# Patient Record
Sex: FEMALE | Race: WHITE | Employment: UNEMPLOYED | URBAN - METROPOLITAN AREA
[De-identification: names, ages, dates, MRNs, and addresses within clinical notes are randomized per-mention and may not be internally consistent; named-entity substitution may affect disease eponyms.]

---

## 2020-12-09 ENCOUNTER — APPOINTMENT (OUTPATIENT)
Dept: GENERAL RADIOLOGY | Age: 50
End: 2020-12-09
Payer: COMMERCIAL

## 2020-12-09 ENCOUNTER — HOSPITAL ENCOUNTER (OUTPATIENT)
Age: 50
Setting detail: OBSERVATION
Discharge: HOME | End: 2020-12-10
Attending: EMERGENCY MEDICINE | Admitting: INTERNAL MEDICINE
Payer: COMMERCIAL

## 2020-12-09 PROBLEM — R07.9 CHEST PAIN: Status: ACTIVE | Noted: 2020-12-09

## 2020-12-09 LAB
A/G RATIO: 1.5 (ref 1.1–2.2)
ALBUMIN SERPL-MCNC: 4 G/DL (ref 3.4–5)
ALP BLD-CCNC: 85 U/L (ref 40–129)
ALT SERPL-CCNC: 12 U/L (ref 10–40)
ANION GAP SERPL CALCULATED.3IONS-SCNC: 10 MMOL/L (ref 3–16)
AST SERPL-CCNC: 12 U/L (ref 15–37)
BASOPHILS ABSOLUTE: 0.1 K/UL (ref 0–0.2)
BASOPHILS RELATIVE PERCENT: 0.8 %
BILIRUB SERPL-MCNC: <0.2 MG/DL (ref 0–1)
BUN BLDV-MCNC: 22 MG/DL (ref 7–20)
CALCIUM SERPL-MCNC: 9.5 MG/DL (ref 8.3–10.6)
CHLORIDE BLD-SCNC: 107 MMOL/L (ref 99–110)
CO2: 23 MMOL/L (ref 21–32)
CREAT SERPL-MCNC: 0.7 MG/DL (ref 0.6–1.1)
D DIMER: 0.27 UG/ML FEU
EOSINOPHILS ABSOLUTE: 0.1 K/UL (ref 0–0.6)
EOSINOPHILS RELATIVE PERCENT: 0.8 %
GFR AFRICAN AMERICAN: >60
GFR NON-AFRICAN AMERICAN: >60
GLOBULIN: 2.7 G/DL
GLUCOSE BLD-MCNC: 93 MG/DL (ref 70–99)
HCT VFR BLD CALC: 37.1 % (ref 36–48)
HEMOGLOBIN: 12.2 G/DL (ref 12–16)
LYMPHOCYTES ABSOLUTE: 4 K/UL (ref 1–5.1)
LYMPHOCYTES RELATIVE PERCENT: 37.7 %
MCH RBC QN AUTO: 26.5 PG (ref 26–34)
MCHC RBC AUTO-ENTMCNC: 32.8 G/DL (ref 31–36)
MCV RBC AUTO: 80.7 FL (ref 80–100)
MONOCYTES ABSOLUTE: 0.7 K/UL (ref 0–1.3)
MONOCYTES RELATIVE PERCENT: 6.8 %
NEUTROPHILS ABSOLUTE: 5.8 K/UL (ref 1.7–7.7)
NEUTROPHILS RELATIVE PERCENT: 53.9 %
PDW BLD-RTO: 13.6 % (ref 12.4–15.4)
PLATELET # BLD: 279 K/UL (ref 135–450)
PMV BLD AUTO: 10.1 FL (ref 5–10.5)
POTASSIUM SERPL-SCNC: 3.8 MMOL/L (ref 3.5–5.1)
RBC # BLD: 4.59 M/UL (ref 4–5.2)
SODIUM BLD-SCNC: 140 MMOL/L (ref 136–145)
TOTAL PROTEIN: 6.7 G/DL (ref 6.4–8.2)
TROPONIN: <0.01 NG/ML
WBC # BLD: 10.7 K/UL (ref 4–11)

## 2020-12-09 PROCEDURE — 36415 COLL VENOUS BLD VENIPUNCTURE: CPT

## 2020-12-09 PROCEDURE — 85379 FIBRIN DEGRADATION QUANT: CPT

## 2020-12-09 PROCEDURE — 6370000000 HC RX 637 (ALT 250 FOR IP): Performed by: EMERGENCY MEDICINE

## 2020-12-09 PROCEDURE — 84484 ASSAY OF TROPONIN QUANT: CPT

## 2020-12-09 PROCEDURE — 99284 EMERGENCY DEPT VISIT MOD MDM: CPT

## 2020-12-09 PROCEDURE — 71045 X-RAY EXAM CHEST 1 VIEW: CPT

## 2020-12-09 PROCEDURE — 80053 COMPREHEN METABOLIC PANEL: CPT

## 2020-12-09 PROCEDURE — 85025 COMPLETE CBC W/AUTO DIFF WBC: CPT

## 2020-12-09 PROCEDURE — 93005 ELECTROCARDIOGRAM TRACING: CPT | Performed by: EMERGENCY MEDICINE

## 2020-12-09 RX ORDER — ERGOCALCIFEROL (VITAMIN D2) 1250 MCG
50000 CAPSULE ORAL WEEKLY
COMMUNITY
Start: 2020-08-19

## 2020-12-09 RX ORDER — VENLAFAXINE HYDROCHLORIDE 75 MG/1
75 CAPSULE, EXTENDED RELEASE ORAL 2 TIMES DAILY
COMMUNITY
Start: 2020-10-20

## 2020-12-09 RX ORDER — LOSARTAN POTASSIUM 50 MG/1
50 TABLET ORAL DAILY
COMMUNITY
Start: 2020-09-18

## 2020-12-09 RX ORDER — NITROGLYCERIN 0.4 MG/1
0.4 TABLET SUBLINGUAL EVERY 5 MIN PRN
Status: DISCONTINUED | OUTPATIENT
Start: 2020-12-09 | End: 2020-12-10 | Stop reason: HOSPADM

## 2020-12-09 RX ORDER — ARIPIPRAZOLE 5 MG/1
5 TABLET ORAL DAILY
COMMUNITY
Start: 2020-08-19

## 2020-12-09 RX ORDER — LEVOTHYROXINE SODIUM 0.12 MG/1
125 TABLET ORAL
COMMUNITY
Start: 2020-08-19

## 2020-12-09 RX ORDER — ASPIRIN 325 MG
325 TABLET ORAL ONCE
Status: COMPLETED | OUTPATIENT
Start: 2020-12-09 | End: 2020-12-09

## 2020-12-09 RX ORDER — TOPIRAMATE 100 MG/1
100 TABLET, FILM COATED ORAL 2 TIMES DAILY
COMMUNITY
Start: 2020-08-19

## 2020-12-09 RX ADMIN — ASPIRIN 325 MG ORAL TABLET 325 MG: 325 PILL ORAL at 22:13

## 2020-12-09 RX ADMIN — NITROGLYCERIN 0.4 MG: 0.4 TABLET SUBLINGUAL at 22:13

## 2020-12-09 RX ADMIN — NITROGLYCERIN 0.4 MG: 0.4 TABLET SUBLINGUAL at 22:41

## 2020-12-09 SDOH — HEALTH STABILITY: MENTAL HEALTH: HOW OFTEN DO YOU HAVE A DRINK CONTAINING ALCOHOL?: NEVER

## 2020-12-09 ASSESSMENT — PAIN DESCRIPTION - PAIN TYPE
TYPE: ACUTE PAIN

## 2020-12-09 ASSESSMENT — PAIN - FUNCTIONAL ASSESSMENT
PAIN_FUNCTIONAL_ASSESSMENT: PREVENTS OR INTERFERES SOME ACTIVE ACTIVITIES AND ADLS
PAIN_FUNCTIONAL_ASSESSMENT: ACTIVITIES ARE NOT PREVENTED

## 2020-12-09 ASSESSMENT — PAIN DESCRIPTION - LOCATION
LOCATION: CHEST

## 2020-12-09 ASSESSMENT — PAIN DESCRIPTION - ONSET: ONSET: SUDDEN

## 2020-12-09 ASSESSMENT — PAIN DESCRIPTION - DIRECTION
RADIATING_TOWARDS: NECK
RADIATING_TOWARDS: LEFT NECK
RADIATING_TOWARDS: NECK

## 2020-12-09 ASSESSMENT — PAIN SCALES - GENERAL
PAINLEVEL_OUTOF10: 0
PAINLEVEL_OUTOF10: 3

## 2020-12-09 ASSESSMENT — PAIN DESCRIPTION - PROGRESSION
CLINICAL_PROGRESSION: GRADUALLY IMPROVING
CLINICAL_PROGRESSION: GRADUALLY WORSENING
CLINICAL_PROGRESSION: GRADUALLY IMPROVING

## 2020-12-09 ASSESSMENT — PAIN DESCRIPTION - ORIENTATION
ORIENTATION: LEFT

## 2020-12-09 ASSESSMENT — PAIN DESCRIPTION - FREQUENCY
FREQUENCY: CONTINUOUS
FREQUENCY: CONTINUOUS

## 2020-12-09 ASSESSMENT — PAIN DESCRIPTION - DESCRIPTORS: DESCRIPTORS: HEAVINESS

## 2020-12-09 ASSESSMENT — HEART SCORE: ECG: 0

## 2020-12-09 NOTE — LETTER
[facility] 3N IP Rehab  [address] [facility]  Phone: [phone]             December 10, 2020    Patient: Serafin Lundberg   YOB: 1970   Date of Visit: 12/9/2020       To Whom It May Concern:    Serafin Lundberg was seen and treated in our facility  beginning 12/9/2020 until 12/10/20. She may return to work on 12/14/20.       Sincerely,       Wellington Sharma RN         Signature:__________________________________

## 2020-12-10 VITALS
OXYGEN SATURATION: 98 % | TEMPERATURE: 98.1 F | DIASTOLIC BLOOD PRESSURE: 74 MMHG | HEART RATE: 79 BPM | WEIGHT: 261.69 LBS | HEIGHT: 69 IN | SYSTOLIC BLOOD PRESSURE: 106 MMHG | BODY MASS INDEX: 38.76 KG/M2 | RESPIRATION RATE: 16 BRPM

## 2020-12-10 PROBLEM — I10 ESSENTIAL HYPERTENSION: Status: ACTIVE | Noted: 2020-12-10

## 2020-12-10 PROBLEM — E03.9 HYPOTHYROID: Status: ACTIVE | Noted: 2020-12-10

## 2020-12-10 LAB
CHOLESTEROL, TOTAL: 162 MG/DL (ref 0–199)
EKG ATRIAL RATE: 65 BPM
EKG DIAGNOSIS: NORMAL
EKG P AXIS: 52 DEGREES
EKG P-R INTERVAL: 154 MS
EKG Q-T INTERVAL: 412 MS
EKG QRS DURATION: 88 MS
EKG QTC CALCULATION (BAZETT): 428 MS
EKG R AXIS: 35 DEGREES
EKG T AXIS: 36 DEGREES
EKG VENTRICULAR RATE: 65 BPM
HCG QUALITATIVE: NEGATIVE
HDLC SERPL-MCNC: 50 MG/DL (ref 40–60)
LDL CHOLESTEROL CALCULATED: 82 MG/DL
LV EF: 72 %
LVEF MODALITY: NORMAL
SARS-COV-2, NAAT: NOT DETECTED
TRIGL SERPL-MCNC: 151 MG/DL (ref 0–150)
TROPONIN: <0.01 NG/ML
TROPONIN: <0.01 NG/ML
VLDLC SERPL CALC-MCNC: 30 MG/DL

## 2020-12-10 PROCEDURE — 36415 COLL VENOUS BLD VENIPUNCTURE: CPT

## 2020-12-10 PROCEDURE — U0002 COVID-19 LAB TEST NON-CDC: HCPCS

## 2020-12-10 PROCEDURE — 90686 IIV4 VACC NO PRSV 0.5 ML IM: CPT | Performed by: INTERNAL MEDICINE

## 2020-12-10 PROCEDURE — 6370000000 HC RX 637 (ALT 250 FOR IP): Performed by: INTERNAL MEDICINE

## 2020-12-10 PROCEDURE — 90471 IMMUNIZATION ADMIN: CPT

## 2020-12-10 PROCEDURE — 84484 ASSAY OF TROPONIN QUANT: CPT

## 2020-12-10 PROCEDURE — 6360000002 HC RX W HCPCS: Performed by: INTERNAL MEDICINE

## 2020-12-10 PROCEDURE — G0378 HOSPITAL OBSERVATION PER HR: HCPCS

## 2020-12-10 PROCEDURE — 3430000000 HC RX DIAGNOSTIC RADIOPHARMACEUTICAL: Performed by: INTERNAL MEDICINE

## 2020-12-10 PROCEDURE — 93010 ELECTROCARDIOGRAM REPORT: CPT | Performed by: INTERNAL MEDICINE

## 2020-12-10 PROCEDURE — 96372 THER/PROPH/DIAG INJ SC/IM: CPT

## 2020-12-10 PROCEDURE — 84703 CHORIONIC GONADOTROPIN ASSAY: CPT

## 2020-12-10 PROCEDURE — 93017 CV STRESS TEST TRACING ONLY: CPT

## 2020-12-10 PROCEDURE — A9502 TC99M TETROFOSMIN: HCPCS | Performed by: INTERNAL MEDICINE

## 2020-12-10 PROCEDURE — 78452 HT MUSCLE IMAGE SPECT MULT: CPT

## 2020-12-10 PROCEDURE — 80061 LIPID PANEL: CPT

## 2020-12-10 PROCEDURE — 2580000003 HC RX 258: Performed by: INTERNAL MEDICINE

## 2020-12-10 RX ORDER — SODIUM CHLORIDE 9 MG/ML
INJECTION, SOLUTION INTRAVENOUS CONTINUOUS
Status: DISCONTINUED | OUTPATIENT
Start: 2020-12-10 | End: 2020-12-10 | Stop reason: HOSPADM

## 2020-12-10 RX ORDER — ACETAMINOPHEN 650 MG/1
650 SUPPOSITORY RECTAL EVERY 4 HOURS PRN
Status: DISCONTINUED | OUTPATIENT
Start: 2020-12-10 | End: 2020-12-10 | Stop reason: HOSPADM

## 2020-12-10 RX ORDER — POLYETHYLENE GLYCOL 3350 17 G/17G
17 POWDER, FOR SOLUTION ORAL DAILY PRN
Status: DISCONTINUED | OUTPATIENT
Start: 2020-12-10 | End: 2020-12-10 | Stop reason: HOSPADM

## 2020-12-10 RX ORDER — ACETAMINOPHEN 325 MG/1
650 TABLET ORAL EVERY 4 HOURS PRN
Status: DISCONTINUED | OUTPATIENT
Start: 2020-12-10 | End: 2020-12-10 | Stop reason: HOSPADM

## 2020-12-10 RX ORDER — SODIUM CHLORIDE 0.9 % (FLUSH) 0.9 %
10 SYRINGE (ML) INJECTION EVERY 12 HOURS SCHEDULED
Status: DISCONTINUED | OUTPATIENT
Start: 2020-12-10 | End: 2020-12-10 | Stop reason: HOSPADM

## 2020-12-10 RX ORDER — VENLAFAXINE HYDROCHLORIDE 75 MG/1
75 CAPSULE, EXTENDED RELEASE ORAL 2 TIMES DAILY
Status: DISCONTINUED | OUTPATIENT
Start: 2020-12-10 | End: 2020-12-10 | Stop reason: HOSPADM

## 2020-12-10 RX ORDER — ONDANSETRON 2 MG/ML
4 INJECTION INTRAMUSCULAR; INTRAVENOUS EVERY 4 HOURS PRN
Status: DISCONTINUED | OUTPATIENT
Start: 2020-12-10 | End: 2020-12-10 | Stop reason: HOSPADM

## 2020-12-10 RX ORDER — TOPIRAMATE 100 MG/1
100 TABLET, FILM COATED ORAL 2 TIMES DAILY
Status: DISCONTINUED | OUTPATIENT
Start: 2020-12-10 | End: 2020-12-10 | Stop reason: HOSPADM

## 2020-12-10 RX ORDER — ARIPIPRAZOLE 5 MG/1
5 TABLET ORAL DAILY
Status: DISCONTINUED | OUTPATIENT
Start: 2020-12-10 | End: 2020-12-10 | Stop reason: HOSPADM

## 2020-12-10 RX ORDER — LEVOTHYROXINE SODIUM 0.12 MG/1
125 TABLET ORAL
Status: DISCONTINUED | OUTPATIENT
Start: 2020-12-10 | End: 2020-12-10 | Stop reason: HOSPADM

## 2020-12-10 RX ORDER — ATORVASTATIN CALCIUM 10 MG/1
10 TABLET, FILM COATED ORAL NIGHTLY
Status: DISCONTINUED | OUTPATIENT
Start: 2020-12-10 | End: 2020-12-10

## 2020-12-10 RX ORDER — LOSARTAN POTASSIUM 50 MG/1
50 TABLET ORAL DAILY
Status: DISCONTINUED | OUTPATIENT
Start: 2020-12-10 | End: 2020-12-10 | Stop reason: HOSPADM

## 2020-12-10 RX ORDER — SODIUM CHLORIDE 0.9 % (FLUSH) 0.9 %
10 SYRINGE (ML) INJECTION PRN
Status: DISCONTINUED | OUTPATIENT
Start: 2020-12-10 | End: 2020-12-10 | Stop reason: HOSPADM

## 2020-12-10 RX ADMIN — VORTIOXETINE 20 MG: 10 TABLET, FILM COATED ORAL at 13:34

## 2020-12-10 RX ADMIN — TOPIRAMATE 100 MG: 100 TABLET, FILM COATED ORAL at 08:01

## 2020-12-10 RX ADMIN — ARIPIPRAZOLE 5 MG: 5 TABLET ORAL at 13:33

## 2020-12-10 RX ADMIN — REGADENOSON 0.4 MG: 0.08 INJECTION, SOLUTION INTRAVENOUS at 10:11

## 2020-12-10 RX ADMIN — ENOXAPARIN SODIUM 40 MG: 40 INJECTION SUBCUTANEOUS at 08:01

## 2020-12-10 RX ADMIN — TETROFOSMIN 30 MILLICURIE: 1.38 INJECTION, POWDER, LYOPHILIZED, FOR SOLUTION INTRAVENOUS at 10:14

## 2020-12-10 RX ADMIN — VENLAFAXINE HYDROCHLORIDE 75 MG: 75 CAPSULE, EXTENDED RELEASE ORAL at 08:01

## 2020-12-10 RX ADMIN — TETROFOSMIN 10 MILLICURIE: 1.38 INJECTION, POWDER, LYOPHILIZED, FOR SOLUTION INTRAVENOUS at 08:59

## 2020-12-10 RX ADMIN — INFLUENZA A VIRUS A/VICTORIA/2454/2019 IVR-207 (H1N1) ANTIGEN (PROPIOLACTONE INACTIVATED), INFLUENZA A VIRUS A/HONG KONG/2671/2019 IVR-208 (H3N2) ANTIGEN (PROPIOLACTONE INACTIVATED), INFLUENZA B VIRUS B/VICTORIA/705/2018 BVR-11 ANTIGEN (PROPIOLACTONE INACTIVATED), INFLUENZA B VIRUS B/PHUKET/3073/2013 BVR-1B ANTIGEN (PROPIOLACTONE INACTIVATED) 0.5 ML: 15; 15; 15; 15 INJECTION, SUSPENSION INTRAMUSCULAR at 08:08

## 2020-12-10 RX ADMIN — SODIUM CHLORIDE: 9 INJECTION, SOLUTION INTRAVENOUS at 05:13

## 2020-12-10 RX ADMIN — LEVOTHYROXINE SODIUM 125 MCG: 0.12 TABLET ORAL at 08:00

## 2020-12-10 ASSESSMENT — PAIN SCALES - GENERAL: PAINLEVEL_OUTOF10: 0

## 2020-12-10 NOTE — H&P
Hospital Medicine  History and Physical    PCP: No primary care provider on file. Patient Name: Vamshi Narayanan    Date of Service: Pt seen/examined on 12/10/2020 and placed in observation. CHIEF COMPLAINT:  Pt c/o chest pain  HISTORY OF PRESENT ILLNESS: Pt is an 45y.o. year-old female with a history of hypertension, hypothyroidism and was positive for COVID19 on 11/21/2020. She presented to the [facility] emergency room for evaluation following onset of chest pain 2 hours before her arrival.  He describes a moderately severe tightness in her left chest which is worse with exertion and improved with rest.  The sensation has been present since onset and has eased up quite a bit since initial presentation. Pain does not radiate it is not made better or worse when she takes a deep breath, it is not associated with diaphoresis, nausea or shortness of breath. Her D-dimer was not elevated and a CXR had no acute disease. She is being admitted for further evaluation and treatment. Associated signs and symptoms do not include typical chest pain, shortness of breath, diaphoresis, edema, orthopnea, paroxysmal nocturnal dyspnea, fever or chills. Past Medical History:        Diagnosis Date    Diabetes mellitus ([facility] [address]     Hypertension     Hypothyroid        Past Surgical History:    History reviewed. No pertinent surgical history. Allergies:  Clindamycin and Moxifloxacin    Medications Prior to Admission:    Prior to Admission medications    Medication Sig Start Date End Date Taking?  Authorizing Provider   metFORMIN (GLUCOPHAGE) 500 MG tablet Take 500 mg by mouth daily (with breakfast) 8/19/20  Yes Historical Provider, MD   losartan (COZAAR) 50 MG tablet Take 50 mg by mouth daily 9/18/20  Yes Historical Provider, MD   ARIPiprazole (ABILIFY) 5 MG tablet Take 5 mg by mouth daily 8/19/20  Yes Historical Provider, MD   levothyroxine (SYNTHROID) 125 MCG tablet Take 125 mcg by mouth every morning (before breakfast) 8/19/20  Yes Historical Provider, MD   topiramate (TOPAMAX) 100 MG tablet Take 100 mg by mouth 2 times daily 8/19/20  Yes Historical Provider, MD   venlafaxine (EFFEXOR XR) 75 MG extended release capsule Take 75 mg by mouth 2 times daily 10/20/20  Yes Historical Provider, MD   VORTIoxetine HBr (TRINTELLIX) 20 MG TABS tablet Take 20 mg by mouth daily (with breakfast) 9/28/20  Yes Historical Provider, MD   ergocalciferol (ERGOCALCIFEROL) 1.25 MG (12000 UT) capsule Take 50,000 Units by mouth once a week 8/19/20  Yes Historical Provider, MD       Family History:    Family history is negative for accelerated coronary artery disease, diabetes or malignancies. Social History:   TOBACCO:   reports that she has never smoked. She has never used smokeless tobacco.  ETOH:   reports no history of alcohol use. OCCUPATION:      REVIEW OF SYSTEMS:  A full review of systems was performed and is negative except for that which appears in the HPI    Physical Exam:    Vitals: /64   Pulse 64   Temp 97.8 °F (36.6 °C) (Oral)   Resp 16   Ht 5' 9\" (1.753 m)   Wt 268 lb 15.4 oz (122 kg)   SpO2 100%   BMI 39.72 kg/m²   General appearance: WD/WN 45y.o. year-old  female who is alert, appears stated age and is cooperative  HEENT: Head: Normocephalic, no lesions, without obvious abnormality. Eye: Normal external eye, conjunctiva, lids cornea, PEERL. Ears: Normal external ears. Non-tender. Nose: Normal external nose, mucus membranes and septum. Pharynx: Dental Hygiene adequate. Normal buccal mucosa. Normal pharynx. Neck: no adenopathy, no carotid bruit, no JVD, supple, symmetrical, trachea midline and thyroid not enlarged, symmetric, no tenderness/mass/nodules  Lungs: clear to auscultation bilaterally and no use of accessory muscles.   Heart: regular rate and rhythm, S1, S2 normal, no murmur, click, rub or gallop and normal apical impulse  Abdomen: soft, non-tender; bowel sounds normal; no masses, no organomegaly  Extremities: extremities atraumatic, no cyanosis or edema and Homans sign is negative, no sign of DVT. Capillary Refill: Acceptable < 3 seconds   Peripheral Pulses: +3 easily felt, not easily obliterated with pressures   Skin: Skin color, texture, turgor normal. No rashes or lesions on exposed skin  Neurologic: Neurovascularly intact without any focal sensory/motor deficits. Cranial nerves: II-XII intact, grossly non-focal. Gait was not tested. Psychiatric: Alert and oriented, thought content appropriate, normal insight    CBC:   Recent Labs     12/09/20 2205   WBC 10.7   HGB 12.2        BMP:    Recent Labs     12/09/20 2205      K 3.8      CO2 23   BUN 22*   CREATININE 0.7   GLUCOSE 93     Troponin:   Recent Labs     12/09/20 2205   TROPONINI <0.01     PT/INR:  No results found for: PTINR  U/A:  No results found for: LEUKOCYTESUR, NITRITE, RBCUA, SPECGRAV, [address], BILIRUBINUR, BLOODU, Jeff Amour      RAD:   I have independently reviewed and interpreted the imaging studies below and based my recommendations to the patient on those findings. Xr Chest Portable    Result Date: 12/9/2020  EXAMINATION: ONE XRAY VIEW OF THE CHEST 12/9/2020 10:06 pm COMPARISON: None. HISTORY: ORDERING SYSTEM PROVIDED HISTORY: chest pain TECHNOLOGIST PROVIDED HISTORY: Reason for exam:->chest pain Reason for Exam: acute cp x 2 hours ago Acuity: Acute Type of Exam: Initial Additional signs and symptoms: positive for covid-19 on 11/22/20, recovered with no hospitalization needed FINDINGS: Portable study is limited by obesity. Heart size and configuration are normal.  Hilar and mediastinal structures are unremarkable. The lungs are clear. No pneumothorax or pleural fluid. No acute bone finding. No acute cardiopulmonary disease.          EKG:   Read by ER in the absence of a Cardiologist shows      Assessment:   Principal Problem:    Chest pain  Active Problems:    Essential hypertension    Acquired hypothyroidism  Resolved Problems:    * No resolved hospital problems. *      Plan:       COVID 19 screening ordered    Chest pain - Patient will be admitted and monitored on telemetry, and we will check serial cardiac enzymes. Aspirin was started in the Emergency Room. Cardiac testing has been ordered for risk stratification. I have discussed other possible etiologies of the symptoms such as Musculoskeletal Pain and GERD, including unique presenting characteritics of each. Patient understands that if the evaluation does not show that the symptoms are secondary to ischemic changes, she will be discharged and instructed to follow up with her outpatient physician to help determine the etiology of her symptoms. Essential (primary) hypertension - continue home meds and monitor blood pressure    Acquired hypothyroidism - stable; continue Levothyroxine          DVT Prophylaxis: Lovenox   Diet: Diet NPO Effective Now Exceptions are: Sips with Meds  Code Status: Full Code  (Advanced care planning has been discussed with patient and/or responsible family member and is reflected in the code status.  Further orders associated with this have been entered if appropriate)    Disposition: Anticipate that patient will remain in the hospital for 1 to 2 days depending on further evaluation and clinical course    Please note that over 50 minutes was spent in evaluating the patient, review of records and results, discussion with staff/family, etc.    Casie Massey MD

## 2020-12-10 NOTE — PLAN OF CARE
Problem: Pain:  Goal: Pain level will decrease  Description: Pain level will decrease  Outcome: Ongoing  C/o bilateral  rib cage pain, prn Narco given x1 during the night.     Problem: Pain:  Goal: Control of acute pain  Description: Control of acute pain  Outcome: Ongoing        Problem: Falls - Risk of:  Goal: Will remain free from falls  Description: Will remain free from falls  Outcome  Calls for assistance appropriately

## 2020-12-10 NOTE — PROGRESS NOTES
Any changes have been noted. PHYSICAL EXAM:  BP (!) 94/59   Pulse 71   Temp 97.5 °F (36.4 °C) (Oral)   Resp 16   Ht 5' 9\" (1.753 m)   Wt 261 lb 11 oz (118.7 kg)   SpO2 96%   BMI 38.64 kg/m²     No intake or output data in the 24 hours ending 12/10/20 1015     General appearance:   No apparent distress, appears stated age. Cooperative. HEENT:  Normocephalic, atraumatic. PERRLA. EOMi. Conjunctivae/corneas clear, no icterus, non-injected. Neck: Supple, with full range of motion. No jugular venous distention. Trachea midline. Respiratory:  Normal respiratory effort. Clear to auscultation, bilaterally without Rales/Wheezes/Rhonchi. Cardiovascular:  Regular rate and rhythm without murmurs, rubs or gallops. Abdomen: Soft, non-tender, non-distended, without rebound or guarding. Normal bowel sounds. Musculoskeletal:  No clubbing, cyanosis or edema bilaterally. Full range of motion without deformity. Skin: Skin color, texture, turgor normal.  No rashes or lesions. Neurologic:  Neurovascularly intact without any focal sensory/motor deficits. Cranial nerves: II-XII intact, grossly intact. No facial asymmetry, tongue midline.    Psychiatric:  Alert and oriented, thought content appropriate  Capillary Refill: Brisk,< 3 seconds   Peripheral Pulses: +2 palpable, equal bilaterally       LABS:    Lab Results   Component Value Date    WBC 10.7 12/09/2020    HGB 12.2 12/09/2020    HCT 37.1 12/09/2020    MCV 80.7 12/09/2020     12/09/2020    LYMPHOPCT 37.7 12/09/2020    RBC 4.59 12/09/2020    MCH 26.5 12/09/2020    MCHC 32.8 12/09/2020    RDW 13.6 12/09/2020       Lab Results   Component Value Date    CREATININE 0.7 12/09/2020    BUN 22 (H) 12/09/2020     12/09/2020    K 3.8 12/09/2020     12/09/2020    CO2 23 12/09/2020       No results found for: MG    Lab Results   Component Value Date    ALT 12 12/09/2020    AST 12 (L) 12/09/2020    ALKPHOS 85 12/09/2020    BILITOT <0.2 12/09/2020        No flowsheet data found. No results found for: LABA1C    Imaging:  XR CHEST PORTABLE   Final Result   No acute cardiopulmonary disease. NM Cardiac Stress Test Nuclear Imaging    (Results Pending)       Scheduled and prn Medications:    Scheduled Meds:   VORTIoxetine  20 mg Oral Daily with breakfast    venlafaxine  75 mg Oral BID    topiramate  100 mg Oral BID    losartan  50 mg Oral Daily    levothyroxine  125 mcg Oral QAM AC    ARIPiprazole  5 mg Oral Daily    sodium chloride flush  10 mL Intravenous 2 times per day    enoxaparin  40 mg Subcutaneous Daily     Continuous Infusions:   sodium chloride 75 mL/hr at 12/10/20 0513     PRN Meds:.sodium chloride flush, acetaminophen **OR** acetaminophen, polyethylene glycol, ondansetron, nitroGLYCERIN    Assessment & Plan:        COVID 19 screening ordered     Chest pain   -  Telemetry  -  serial cardiac enzymes - neg   - Aspirin was started in the Emergency Room. - Cardiac testing has been ordered for risk stratification.  -  I have discussed other possible etiologies of the symptoms such as Musculoskeletal Pain and GERD, including unique presenting characteritics of each. Patient understands that if the evaluation does not show that the symptoms are secondary to ischemic changes, she will be discharged and instructed to follow up with her outpatient physician to help determine the etiology of her symptoms.      Essential (primary) hypertension   - continue home meds and monitor blood pressure     Acquired hypothyroidism   - stable; continue Levothyroxine       Continue current regimen/therapies. Monitor. Adjust medical regimen as appropriate. Body mass index is 38.64 kg/m². The patient and / or the family were informed of the results of any tests, a time was given to answer questions, a plan was proposed and they agreed with plan.       DVT ppx: lovenox      Diet: Diet NPO Effective Now Exceptions are: Sips with Meds    Consults:  IP CONSULT TO HOSPITALIST    DISPO/placement plan:home    Code Status: Full Code      Kevin Cano, SATISH - QIAN  12/10/20

## 2020-12-10 NOTE — PROGRESS NOTES
Pt arrived via stretcher from [place] ED to room 3271. VS, assessment, and admission completed. 4 eyes assessment completed. Patient is A X O x4, communicates appropriately. Being admitted with chest pain, denies chest pain at the moment. Pt oriented to the  and room. Call light and bedside table in reach. All questions answered. Pt resting quietly in bed with no complaints  at this time. Dr. Jam Mcnair notified of patient's arrival. Will continue to monitor.

## 2020-12-10 NOTE — DISCHARGE SUMMARY
Hospital Medicine Discharge Summary    Patient ID: Adam Ramos      Patient's PCP: No primary care provider on file. Admit Date: 12/9/2020     Discharge Date:   12/10/2020    Admitting Physician: Harmeet Porter MD     Discharge Physician: SATISH Garrido CNP       Discharge Diagnoses: Active Hospital Problems    Diagnosis Date Noted    Essential hypertension [I10] 12/10/2020    Acquired hypothyroidism [E03.9] 12/10/2020    Chest pain [R07.9] 12/09/2020       The patient was seen and examined on day of discharge and this discharge summary is in conjunction with any daily progress note from day of discharge. Disposition:  [x] Home  [] Home with home health [] Rehab [] Psych [] SNF  [] LTAC  [] Long term nursing home or group home [] Transfer to ICU  [] Transfer to PCU [] Other:      Discharge Instructions/Follow-up:  Lose weight, exercise, follow low fat diet (BMI 38.64)     PCP/SNF to follow up: PCP 1-2 wks    D/C condition: stable    Code status: full    Activity: as tolerated     Diet: low fat    D/C Meds: cont home meds    Hospital Course: Pt is an 45y.o. year-old female with a history of hypertension, hypothyroidism and was positive for COVID19 on 11/21/2020. She presented to the [place] emergency room for evaluation following onset of chest pain 2 hours before her arrival. Bryan Rosalba describes a moderately severe tightness in her left chest which is worse with exertion and improved with rest.  The sensation has been present since onset and has eased up quite a bit since initial presentation. Sadiq Riley does not radiate it is not made better or worse when she takes a deep breath, it is not associated with diaphoresis, nausea or shortness of breath.  Her D-dimer was not elevated and a CXR had no acute disease.  She is being admitted for further evaluation and treatment.  Associated signs and symptoms do not include typical chest pain, shortness of breath, diaphoresis, edema, orthopnea, paroxysmal nocturnal dyspnea, fever or chills.     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 Denies recent strenuous activity. Pain free at this time. Had some symptoms of chest pressure and sob with stress test. BMI 38.64 could benefit from exercise and wt loss. Discussed with patient low fat diet and exercise. Possibly deconditioned as well. Pain not pleuritic and no respiratory symptoms.      No reproducible chest pain     Stress test was performed and was neg. Patient's vitals and labs remained stable and unremarkable while here including serial troponins. Her pain was in her left breast that started out heavy feeling and then got more \"poking\" in nature and then started to move into her neck. She had the pain until she got NTG which relieved the symptoms after 2 SL tabs. It recurred during her stress test. She reports having SOB with it during the test as well. She states she had COVID prior to admission in November but was not treated for it. She was not treated for it. 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 The patient had minimal elevation in triglycerides, by one point, so will hold off on starting statin now but have discussed low fat diet and exercise. She is now medically ready for discharge. She should follow up as above and continue home meds as before. Exam:     BP (!) 94/59   Pulse 71   Temp 97.5 °F (36.4 °C) (Oral)   Resp 16   Ht 5' 9\" (1.753 m)   Wt 261 lb 11 oz (118.7 kg)   SpO2 96%   BMI 38.64 kg/m²   General appearance: No apparent distress, appears stated age and cooperative. HEENT: Pupils equal, round, and reactive to light. Conjunctivae/corneas clear. Neck: Supple, with full range of motion. No jugular venous distention. Trachea midline. Respiratory:  Normal respiratory effort. Clear to auscultation, bilaterally without Rales/Wheezes/Rhonchi.   Cardiovascular: Regular rate and rhythm with normal S1/S2 without murmurs, rubs or gallops. Abdomen: Soft, non-tender, non-distended with normal bowel sounds. Musculoskelatal: No clubbing, cyanosis or edema bilaterally. Full range of motion without deformity. Skin: Skin color, texture, turgor normal.  No rashes or lesions. Neurologic:  Neurovascularly intact without any focal sensory/motor deficits. Cranial nerves: II-XII intact, grossly non-focal.  Psychiatric: Alert and oriented, thought content appropriate, normal insight      Consults:     IP CONSULT TO HOSPITALIST    Diagnostic tests: Imaging:  XR CHEST PORTABLE   Final Result   No acute cardiopulmonary disease.           Stress test:   No evidence of reversible ischemia.     There is a moderate sized, mild intensity, fixed anterior wall defect which     is most consistent with breast attenuation artifact.     Normal LV size and systolic function.     Overall findings represent a low risk study. Labs:  For convenience and continuity at follow-up the following most recent labs are provided:      CBC:    Lab Results   Component Value Date    WBC 10.7 12/09/2020    HGB 12.2 12/09/2020    HCT 37.1 12/09/2020     12/09/2020       Renal:    Lab Results   Component Value Date     12/09/2020    K 3.8 12/09/2020     12/09/2020    CO2 23 12/09/2020    BUN 22 12/09/2020    CREATININE 0.7 12/09/2020    CALCIUM 9.5 12/09/2020       Discharge Medications:     Current Discharge Medication List           Details   metFORMIN (GLUCOPHAGE) 500 MG tablet Take 500 mg by mouth daily (with breakfast)      losartan (COZAAR) 50 MG tablet Take 50 mg by mouth daily      ARIPiprazole (ABILIFY) 5 MG tablet Take 5 mg by mouth daily      levothyroxine (SYNTHROID) 125 MCG tablet Take 125 mcg by mouth every morning (before breakfast)      topiramate (TOPAMAX) 100 MG tablet Take 100 mg by mouth 2 times daily      venlafaxine (EFFEXOR XR) 75 MG extended release capsule Take 75 mg by mouth 2 times daily      VORTIoxetine HBr (TRINTELLIX) 20 MG

## 2020-12-10 NOTE — PROGRESS NOTES
Patient alert, oriented, resting in bed. Going for stress test this AM, NPO except sips with meds. No further chest pain. Flu shot administered per request. Will monitor.

## 2020-12-10 NOTE — CARE COORDINATION
CHRIS rec'd dc order. CHRIS spoke with patient over the phone to introduce  role and to initiate discussion regarding DC planning. She reports she is active with PCP, Dr Mays Speaks. She does not have any concerns for DC today. She does request a note from hospital to her boss that states she can return to work. CHRIS informed bedside RN who will speak with MD today about his.   [place], [place]     Case Management   [phone]    12/10/2020  4:03 PM

## 2020-12-10 NOTE — ED PROVIDER NOTES
[facility]  eMERGENCY dEPARTMENT eNCOUnter      Pt Name: Lee Valverde  MRN: [id number]  [place] 1970  Date of evaluation: 12/9/2020  Provider: Shine Tanner MD    CHIEF COMPLAINT       Chief Complaint   Patient presents with    Chest Pain     started two hours ago, acute CP. No hx of heart problems. Had covid19, diagnosed 11/221, recovered without hospitalization. Denies SOB or cough. Wrosening pain with exertion. CRITICAL CARE TIME   Total Critical Care time was 0 minutes, excluding separately reportable procedures. There was a high probability of clinically significant/life threatening deterioration in the patient's condition which required my urgent intervention. HISTORY OF PRESENT ILLNESS  (Location/Symptom, Timing/Onset, Context/Setting, Quality, Duration, Modifying Factors, Severity.)   Lee Valverde is a 45 y.o. female who presents to the emergency department planing of chest pain sudden in onset that started about 2 hours prior to arrival.  Pain is in her left mid chest.  She describes as a squeezing or tightness that gets worse when she exerts herself/walks. No shortness of breath nausea or diaphoresis. No radiation to her arm or neck. She had a Covid infection about 2-1/2 weeks ago, she was diagnosed on the 21st.  He denies any significant cough at this time. Nursing Notes were reviewed and I agree. REVIEW OF SYSTEMS    (2-9 systems for level 4, 10 or more for level 5)     HEENT: No nasal congestion sore throat or earache. Cardiovascular: Left-sided chest pain as above. Pulmonary: No cough or shortness of breath. GI: No abdominal pain nausea vomiting. Musculoskeletal: No lower extremity swelling or pain. Neuro: No dizziness or passing out. Except as noted above the remainder of the review of systems was reviewed and negative.        PAST MEDICAL HISTORY     Past Medical History:   Diagnosis Date    Diabetes mellitus ([facility] [address]     Hypertension     Hypothyroid          SURGICAL HISTORY     History reviewed. No pertinent surgical history. CURRENT MEDICATIONS       Previous Medications    ARIPIPRAZOLE (ABILIFY) 5 MG TABLET    Take 5 mg by mouth daily    ERGOCALCIFEROL (ERGOCALCIFEROL) 1.25 MG (02205 UT) CAPSULE    Take 50,000 Units by mouth once a week    LEVOTHYROXINE (SYNTHROID) 125 MCG TABLET    Take 125 mcg by mouth every morning (before breakfast)    LOSARTAN (COZAAR) 50 MG TABLET    Take 50 mg by mouth daily    METFORMIN (GLUCOPHAGE) 500 MG TABLET    Take 500 mg by mouth daily (with breakfast)    TOPIRAMATE (TOPAMAX) 100 MG TABLET    Take 100 mg by mouth 2 times daily    VENLAFAXINE (EFFEXOR XR) 75 MG EXTENDED RELEASE CAPSULE    Take 75 mg by mouth 2 times daily    VORTIOXETINE HBR (TRINTELLIX) 20 MG TABS TABLET    Take 20 mg by mouth daily (with breakfast)       ALLERGIES     Clindamycin and Moxifloxacin    FAMILY HISTORY     History reviewed. No pertinent family history.        SOCIAL HISTORY       Social History     Socioeconomic History    Marital status:      Spouse name: None    Number of children: None    Years of education: None    Highest education level: None   Occupational History    None   Social Needs    Financial resource strain: None    Food insecurity     Worry: None     Inability: None    Transportation needs     Medical: None     Non-medical: None   Tobacco Use    Smoking status: Never Smoker    Smokeless tobacco: Never Used   Substance and Sexual Activity    Alcohol use: Never     Frequency: Never    Drug use: Never    Sexual activity: Not Currently   Lifestyle    Physical activity     Days per week: None     Minutes per session: None    Stress: None   Relationships    Social connections     Talks on phone: None     Gets together: None     Attends [employer] service: None     Active member of club or organization: None     Attends meetings of clubs or organizations: None     Relationship status: None    Intimate partner violence     Fear of current or ex partner: None     Emotionally abused: None     Physically abused: None     Forced sexual activity: None   Other Topics Concern    None   Social History Narrative    None         PHYSICAL EXAM    (up to 7 for level 4, 8 or more for level 5)     ED Triage Vitals [12/09/20 2153]   BP Temp Temp Source Pulse Resp SpO2 Height Weight   -- 98 °F (36.7 °C) Oral 69 16 99 % 5' 9\" (1.753 m) 268 lb 15.4 oz (122 kg)       General: Alert moderately obese white female no acute distress. Head: Atraumatic and normocephalic. Eyes: No conjunctival injection. Pupils equal round reactive. No discharge. ENT: TMs are normal.  Nose clear. Oropharynx moist without erythema. Neck: Supple without adenopathy, nontender. Heart: Regular rate and rhythm. No murmurs or gallops noted. Lungs: Breath sounds equal bilaterally and clear. Chest wall: Nontender. Abdomen: Obese, soft, nontender. No masses organomegaly. Bowel sounds are normal.  Musculoskeletal: No lower extremity edema. No calf tenderness. Intact symmetrical distal pulses. Skin: Warm and dry, good turgor. No pallor or cyanosis. No rash. Neuro: Awake, alert, oriented. Symmetrical reactive pupils. Intact extraocular movements. No facial asymmetry. Symmetrical motor function. Normal gait. DIFFERENTIAL DIAGNOSIS   Differential includes but is not limited to chest wall pain, GERD, esophageal spasm, angina, myocardial infarction, pulmonary embolism, aortic dissection. DIAGNOSTIC RESULTS     EKG: All EKG's are interpreted by Saud Moraes MD in the absence of a cardiologist.    Sinus rhythm, rate of 65 with a sinus arrhythmia, no acute ST-T wave changes. Rhythm strip shows sinus rhythm with a rate of 65, WV interval of 154 ms, QRS of 88 ms with no other ectopy as interpreted by me. No old EKG available for comparison.     RADIOLOGY:   Non-plain film images such as CT, Ultrasound and MRI are read by the radiologist. Plain radiographic images are visualized and preliminarily interpreted Joanne Holley MD with the below findings:      Interpretation per the Radiologist below, if available at the time of this note:    XR CHEST PORTABLE   Final Result   No acute cardiopulmonary disease. ED BEDSIDE ULTRASOUND:   Performed by ED Physician - none    LABS:  Labs Reviewed   COMPREHENSIVE METABOLIC PANEL - Abnormal; Notable for the following components:       Result Value    BUN 22 (*)     AST 12 (*)     All other components within normal limits    Narrative:     Performed at:  [facility]) [facility]  [address] [facility] [facility]   Phone [phone]   CBC WITH AUTO DIFFERENTIAL    Narrative:     Performed at:  [facility]  [address] [facility]   Phone [phone]   TROPONIN    Narrative:     Performed at:  [address] [place] [address]  [address] [facility]   Phone [phone]   D-DIMER, QUANTITATIVE    Narrative:     Performed at:  [address] [place] [address]  [address] [facility]   Phone [phone]       All other labs were within normal range or not returned as of this dictation. EMERGENCY DEPARTMENT COURSE and DIFFERENTIAL DIAGNOSIS/MDM:   Vitals:    Vitals:    12/09/20 2153 12/09/20 2215 12/09/20 2230 12/09/20 2237   BP:  123/62 111/74 117/78   Pulse: 69 69 66 65   Resp: 16 12 13 12   Temp: 98 °F (36.7 °C)      TempSrc: Oral      SpO2: 99% 100% 100% 100%   Weight: 268 lb 15.4 oz (122 kg)      Height: 5' 9\" (1.753 m)          This patient presents with left-sided pressure/squeezing chest pain that started suddenly. It is at a level 6 on arrival.  She has a history of a recent Covid infection. She is not having a cough or shortness of breath. She is not tachycardic or hypoxic. Her D-dimer is not elevated. I have a low index of suspicion for pulmonary embolism. She has no infiltrate on x-ray, no other acute cardiopulmonary abnormality. Her EKG is normal.  Her troponin is not elevated. Her pain resolved completely with 2 sublingual nitroglycerin. She was given aspirin p.o. She does have some risk factors for heart disease. Her heart score is 4. In light of her heart score 4, her relief with nitroglycerin, no other obvious source for her symptoms I feel she should be monitored, serial cardiac enzymes, and worked up further for cardiac etiology for her symptoms. Test results, diagnosis, and treatment plan were discussed with the patient. She understands the treatment plan and is agreeable. The hospitalist at [facility] will be paged for transfer for admission for monitoring, serial cardiac enzymes and further evaluation. 2320:  Discussed with Dr. Chase Daniels at [facility].  Patient accepted for transfer. CONSULTS:  IP CONSULT TO HOSPITALIST    PROCEDURES:  None    FINAL IMPRESSION      1. Chest pain, unspecified type          DISPOSITION/PLAN   DISPOSITION Decision To Admit 12/09/2020 11:10:35 PM      PATIENT REFERRED TO:  No follow-up provider specified.     DISCHARGE MEDICATIONS:  New Prescriptions    No medications on file       (Please note that portions of this note were completed with a voice recognition program.  Efforts were made to edit the dictations but occasionally words are mis-transcribed.)    Alma Gudino MD  Attending Emergency Physician        Christofer Johnson MD  12/09/20 2616